# Patient Record
Sex: FEMALE | Race: BLACK OR AFRICAN AMERICAN
[De-identification: names, ages, dates, MRNs, and addresses within clinical notes are randomized per-mention and may not be internally consistent; named-entity substitution may affect disease eponyms.]

---

## 2019-12-15 ENCOUNTER — HOSPITAL ENCOUNTER (EMERGENCY)
Dept: HOSPITAL 82 - ED | Age: 59
Discharge: HOME | End: 2019-12-15
Payer: MEDICARE

## 2019-12-15 VITALS — HEIGHT: 55 IN | WEIGHT: 123.46 LBS | BODY MASS INDEX: 28.57 KG/M2

## 2019-12-15 VITALS — DIASTOLIC BLOOD PRESSURE: 87 MMHG | SYSTOLIC BLOOD PRESSURE: 150 MMHG

## 2019-12-15 DIAGNOSIS — E11.9: ICD-10-CM

## 2019-12-15 DIAGNOSIS — Z79.84: ICD-10-CM

## 2019-12-15 DIAGNOSIS — I10: ICD-10-CM

## 2019-12-15 DIAGNOSIS — J06.9: Primary | ICD-10-CM

## 2020-10-05 ENCOUNTER — HOSPITAL ENCOUNTER (EMERGENCY)
Dept: HOSPITAL 82 - ED | Age: 60
Discharge: HOME | End: 2020-10-05
Payer: MEDICARE

## 2020-10-05 VITALS — DIASTOLIC BLOOD PRESSURE: 77 MMHG | SYSTOLIC BLOOD PRESSURE: 122 MMHG

## 2020-10-05 VITALS — BODY MASS INDEX: 22.96 KG/M2 | WEIGHT: 99.21 LBS | HEIGHT: 55 IN

## 2020-10-05 DIAGNOSIS — S80.01XA: Primary | ICD-10-CM

## 2020-10-05 DIAGNOSIS — I10: ICD-10-CM

## 2020-10-05 DIAGNOSIS — E11.9: ICD-10-CM

## 2020-10-05 DIAGNOSIS — Z79.4: ICD-10-CM
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
## 2021-02-20 ENCOUNTER — HOSPITAL ENCOUNTER (EMERGENCY)
Dept: HOSPITAL 82 - ED | Age: 61
LOS: 1 days | Discharge: NURSING, REHAB OR LONG-TERM CARE | End: 2021-02-21
Payer: MEDICARE

## 2021-02-20 VITALS — HEIGHT: 58 IN | WEIGHT: 105.82 LBS | BODY MASS INDEX: 22.21 KG/M2
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
## 2021-02-20 DIAGNOSIS — S43.014A: Primary | ICD-10-CM
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
## 2021-02-20 DIAGNOSIS — I10: ICD-10-CM

## 2021-02-20 DIAGNOSIS — Z79.4: ICD-10-CM

## 2021-02-20 DIAGNOSIS — Z20.822: ICD-10-CM

## 2021-02-20 DIAGNOSIS — E11.9: ICD-10-CM

## 2021-02-20 DIAGNOSIS — R20.2: ICD-10-CM

## 2021-02-20 PROCEDURE — 0RSJXZZ REPOSITION RIGHT SHOULDER JOINT, EXTERNAL APPROACH: ICD-10-PCS | Performed by: EMERGENCY MEDICINE

## 2021-02-21 VITALS — SYSTOLIC BLOOD PRESSURE: 124 MMHG | DIASTOLIC BLOOD PRESSURE: 60 MMHG

## 2021-07-09 ENCOUNTER — HOSPITAL ENCOUNTER (OUTPATIENT)
Dept: HOSPITAL 82 - ED | Age: 61
Setting detail: OBSERVATION
LOS: 1 days | Discharge: HOME | End: 2021-07-10
Attending: STUDENT IN AN ORGANIZED HEALTH CARE EDUCATION/TRAINING PROGRAM | Admitting: STUDENT IN AN ORGANIZED HEALTH CARE EDUCATION/TRAINING PROGRAM
Payer: MEDICARE

## 2021-07-09 VITALS — HEIGHT: 58 IN | WEIGHT: 112.44 LBS | BODY MASS INDEX: 23.6 KG/M2

## 2021-07-09 DIAGNOSIS — Z79.4: ICD-10-CM

## 2021-07-09 DIAGNOSIS — F79: ICD-10-CM

## 2021-07-09 DIAGNOSIS — E11.649: Primary | ICD-10-CM

## 2021-07-09 DIAGNOSIS — Z20.822: ICD-10-CM

## 2021-07-09 DIAGNOSIS — I10: ICD-10-CM

## 2021-07-09 DIAGNOSIS — E78.5: ICD-10-CM

## 2021-07-09 LAB
ALBUMIN SERPL-MCNC: 4.2 G/DL (ref 3.2–5)
ALP SERPL-CCNC: 92 U/L (ref 38–126)
ANION GAP SERPL CALCULATED.3IONS-SCNC: 17 MMOL/L
AST SERPL-CCNC: 43 U/L (ref 9–36)
BASOPHILS NFR BLD AUTO: 0 % (ref 0–3)
BUN SERPL-MCNC: 16 MG/DL (ref 8–23)
BUN/CREAT SERPL: 32
CHLORIDE SERPL-SCNC: 101 MMOL/L (ref 95–108)
CO2 SERPL-SCNC: 23 MMOL/L (ref 22–30)
CREAT SERPL-MCNC: 0.5 MG/DL (ref 0.5–1)
EOSINOPHIL NFR BLD AUTO: 0 % (ref 0–8)
ERYTHROCYTE [DISTWIDTH] IN BLOOD BY AUTOMATED COUNT: 13.9 % (ref 11.5–15.5)
ETHANOL SERPL-MCNC: 0 MG/DL (ref 0–30)
HCT VFR BLD AUTO: 39.1 % (ref 37–47)
HGB BLD-MCNC: 12.3 G/DL (ref 12–16)
IMM GRANULOCYTES NFR BLD: 0.3 % (ref 0–5)
LIPASE SERPL-CCNC: 554 U/L (ref 23–300)
LYMPHOCYTES NFR BLD: 17 % (ref 15–41)
MCH RBC QN AUTO: 29.6 PG  CALC (ref 26–32)
MCHC RBC AUTO-ENTMCNC: 31.5 G/DL CAL (ref 32–36)
MCV RBC AUTO: 94 FL  CALC (ref 80–100)
MONOCYTES NFR BLD AUTO: 7 % (ref 2–13)
NEUTROPHILS # BLD AUTO: 7.39 THOU/UL (ref 2–7.15)
NEUTROPHILS NFR BLD AUTO: 76 % (ref 42–76)
PLATELET # BLD AUTO: 326 THOU/UL (ref 130–400)
POTASSIUM SERPL-SCNC: 4.2 MMOL/L (ref 3.5–5.1)
PROT SERPL-MCNC: 8.1 G/DL (ref 6.3–8.2)
RBC # BLD AUTO: 4.16 MILL/UL (ref 4.2–5.6)
SODIUM SERPL-SCNC: 137 MMOL/L (ref 137–146)

## 2021-07-09 PROCEDURE — G0378 HOSPITAL OBSERVATION PER HR: HCPCS

## 2021-07-09 NOTE — NUR
phoned patient sister candido deng at home phone and cell phone left a voice
message to return call to discuss medications that were given.

## 2021-07-09 NOTE — NUR
PT ATE NUTRIO GARAIN BAR, AND SNACK SIZE CANDY PIECE (PER MD OK) HER SISTER
BROUGHT HER A SERVING OF GRAPES AND SHE ATE THOSE AS WELL, ACCU CHECK CURRENTLY
262. PT STATES FEELING FINE.

## 2021-07-09 NOTE — NUR
pt resting second snack provided per MD request accu check at this time 157,
per sister (via phone) she is on her way to [facility]>

## 2021-07-09 NOTE — NUR
care assumed pt eating food tray, tolerating weel, offers no complaints, states
that her sister usually doles out her medication but she administers it to 
herself.

## 2021-07-09 NOTE — NUR
UPON A NURSE RECHECKING PT BG, IT HAS DROPPED FROM 121 TO 50. MD NOTIFIED AND
D50% GIVEN IV. ORANGE JUICE WAS GIVEN AS WELL. CT DELAYED UNTIL SUGARS ARE
CONTROLLED. PT AOX4.

## 2021-07-09 NOTE — NUR
PRINTED SBAR RECEIVED. CALL RECEIVED FROM ED REGARDING PT PLACEMENT. ROOM 272
ASSIGNED AT THIS TIME. ROOM PREPARED TO RECEIVE PT BY ANSON CLINTON AND ELA CLINTON.

## 2021-07-09 NOTE — NUR
SISTER AT BEDSIDE STATES THAT THEY CHANGED HER MEDICATION RECENTLY AND SH HAS
BEEN RUNNING LOW IN THE MORNINGS. INSTRUCTED TO MAKE SURE PT IS TAKING A GOOD
SNACK PRIOR TO BEDTIME TO HELP CARRY HER THRU THE NIGHT. AND TO KEEP A RUNNING
OLOG OD BS'S TO TAKE TO F/U VISIT

## 2021-07-09 NOTE — NUR
PT ARRIVES TO ROOM VIA EMS. BEDSIDE TRIAGE COMPLETED. EMS UNABLE TO TELL US
MEDICATIONS AND PATIENT HAS HX OF MENTAL DELAY. AWAITING FAMILY TO ARRIVE

## 2021-07-10 VITALS — SYSTOLIC BLOOD PRESSURE: 154 MMHG | DIASTOLIC BLOOD PRESSURE: 81 MMHG

## 2021-07-10 VITALS — DIASTOLIC BLOOD PRESSURE: 78 MMHG | SYSTOLIC BLOOD PRESSURE: 133 MMHG

## 2021-07-10 VITALS — DIASTOLIC BLOOD PRESSURE: 60 MMHG | SYSTOLIC BLOOD PRESSURE: 140 MMHG

## 2021-07-10 VITALS — SYSTOLIC BLOOD PRESSURE: 119 MMHG | DIASTOLIC BLOOD PRESSURE: 66 MMHG

## 2021-07-10 LAB
BILIRUB UR QL STRIP.AUTO: NEGATIVE
COLOR UR AUTO: YELLOW
GLUCOSE UR STRIP.AUTO-MCNC: >=1000 MG/DL
HGB UR QL STRIP.AUTO: NEGATIVE
KETONES UR STRIP.AUTO-MCNC: NEGATIVE MG/DL
LEUKOCYTE ESTERASE UR QL STRIP.AUTO: NEGATIVE
NITRITE UR QL STRIP.AUTO: NEGATIVE
PH UR STRIP.AUTO: 7 [PH] (ref 4.5–8)
PROT UR QL STRIP.AUTO: NEGATIVE MG/DL
SP GR UR STRIP.AUTO: 1.02
UROBILINOGEN UR QL STRIP.AUTO: 0.2 E.U./DL

## 2021-07-10 NOTE — NUR
PT TRANSPORTED TO MED SURG VIA STRETCHER WITH TELE IN PLACE AND ALL BELONGINGS
SENT WITH PT, RECORDS BELONGING FORM (200) COMPLETED.

## 2021-07-10 NOTE — NUR
CALL RECEIVED FROM PT'S SISTER/ CAREGIVER. COMMUNICATION CODE PROVIDED BY
SISTER. PLAN OF CARE AND PT'S CONDITION REVIEWED. QUESTIONS ANSWERED.

## 2021-07-10 NOTE — NUR
REPORT RECEIVED FROM JR DRAPER. PATIENT IS EATING HER BREAKFAST WITH NO
DISTRESS NOTED. PATIENT DENIES NEEDS. SAFETY PRECAUTION REINFORCED AND CALL
LIGHT IN REACH.

## 2021-07-10 NOTE — NUR
PATIENT IS EATING HER LUNCH WITH NO DISTRESS NOTED. PATIENT DENIES ANY NEEDS
AT THIS TIME. CALL LIGHT IN REACH.

## 2021-07-10 NOTE — NUR
POINT OF CARE GLUCOSE 46mg/dl. PT ASYMPTOMATIC. GIVEN ORANGE JUICE TO DRINK.
ORDER FOR D10% RECEIVED FROM DR. MARTIN. D10% IV ADMINISTERED, SEE E-MAR.

## 2021-07-10 NOTE — NUR
AT 0103 PT ARRIVES TO UNIT VIA STRETCHER, ACCOMPANIED BY SAMUEL TYLER RN. PT
ADMITTED TO ROOM 272. AMBULATORY FROM STRETCHER TO BED. GAIT STEADY AND
BALANCED. PT ORIENTED TO ROOM, BED AND CALL BELL SYSTEM. CALL BELL WITHIN
REACH, AGREES TO CALL PRN.

## 2021-07-10 NOTE — NUR
POINT OF CARE GLUCOSE 161mg/dl. PT REQUESTS ORANGE JUICE. ORANGE JUICE
PROVIDED. PT DENIES FURTHER NEEDS AT THIS TIME. CALL BELL WITHIN REACH, AGREES
TO CALL PRN.

## 2021-09-02 ENCOUNTER — HOSPITAL ENCOUNTER (EMERGENCY)
Dept: HOSPITAL 82 - ED | Age: 61
Discharge: HOME | End: 2021-09-02
Payer: MEDICARE

## 2021-09-02 VITALS — WEIGHT: 124.12 LBS | HEIGHT: 58 IN | BODY MASS INDEX: 26.05 KG/M2

## 2021-09-02 VITALS — DIASTOLIC BLOOD PRESSURE: 61 MMHG | SYSTOLIC BLOOD PRESSURE: 125 MMHG

## 2021-09-02 DIAGNOSIS — F79: ICD-10-CM

## 2021-09-02 DIAGNOSIS — E11.649: Primary | ICD-10-CM

## 2021-09-02 DIAGNOSIS — Z79.4: ICD-10-CM

## 2021-09-02 LAB
ALBUMIN SERPL-MCNC: 4.4 G/DL (ref 3.2–5)
ALP SERPL-CCNC: 92 U/L (ref 38–126)
AMYLASE SERPL-CCNC: 202 U/L (ref 30–110)
ANION GAP SERPL CALCULATED.3IONS-SCNC: 18 MMOL/L
AST SERPL-CCNC: 38 U/L (ref 9–36)
BASOPHILS NFR BLD AUTO: 0 % (ref 0–3)
BUN SERPL-MCNC: 18 MG/DL (ref 8–23)
BUN/CREAT SERPL: 24
CHLORIDE SERPL-SCNC: 105 MMOL/L (ref 95–108)
CO2 SERPL-SCNC: 19 MMOL/L (ref 22–30)
CREAT SERPL-MCNC: 0.8 MG/DL (ref 0.5–1)
EOSINOPHIL NFR BLD AUTO: 2 % (ref 0–8)
ERYTHROCYTE [DISTWIDTH] IN BLOOD BY AUTOMATED COUNT: 14.8 % (ref 11.5–15.5)
HCT VFR BLD AUTO: 39.3 % (ref 37–47)
HGB BLD-MCNC: 12 G/DL (ref 12–16)
IMM GRANULOCYTES NFR BLD: 0.2 % (ref 0–5)
LIPASE SERPL-CCNC: 417 U/L (ref 23–300)
LYMPHOCYTES NFR BLD: 21 % (ref 15–41)
MCH RBC QN AUTO: 29.9 PG  CALC (ref 26–32)
MCHC RBC AUTO-ENTMCNC: 30.5 G/DL CAL (ref 32–36)
MCV RBC AUTO: 98 FL  CALC (ref 80–100)
MONOCYTES NFR BLD AUTO: 8 % (ref 2–13)
NEUTROPHILS # BLD AUTO: 6.44 THOU/UL (ref 2–7.15)
NEUTROPHILS NFR BLD AUTO: 69 % (ref 42–76)
PLATELET # BLD AUTO: 248 THOU/UL (ref 130–400)
POTASSIUM SERPL-SCNC: 3.9 MMOL/L (ref 3.5–5.1)
PROT SERPL-MCNC: 8 G/DL (ref 6.3–8.2)
RBC # BLD AUTO: 4.01 MILL/UL (ref 4.2–5.6)
SODIUM SERPL-SCNC: 139 MMOL/L (ref 137–146)

## 2021-09-04 ENCOUNTER — HOSPITAL ENCOUNTER (EMERGENCY)
Dept: HOSPITAL 82 - ED | Age: 61
Discharge: HOME | End: 2021-09-04
Payer: MEDICARE

## 2021-09-04 VITALS — WEIGHT: 123.46 LBS | HEIGHT: 58 IN | BODY MASS INDEX: 25.92 KG/M2

## 2021-09-04 VITALS — DIASTOLIC BLOOD PRESSURE: 65 MMHG | SYSTOLIC BLOOD PRESSURE: 134 MMHG

## 2021-09-04 DIAGNOSIS — Z79.4: ICD-10-CM

## 2021-09-04 DIAGNOSIS — Z20.822: ICD-10-CM

## 2021-09-04 DIAGNOSIS — E11.649: Primary | ICD-10-CM

## 2021-09-04 LAB
ALBUMIN SERPL-MCNC: 2.9 G/DL (ref 3.2–5)
ALP SERPL-CCNC: 68 U/L (ref 38–126)
ANION GAP SERPL CALCULATED.3IONS-SCNC: 11 MMOL/L
AST SERPL-CCNC: 32 U/L (ref 9–36)
BASOPHILS NFR BLD AUTO: 0 % (ref 0–3)
BUN SERPL-MCNC: 19 MG/DL (ref 8–23)
BUN/CREAT SERPL: 38
CHLORIDE SERPL-SCNC: 111 MMOL/L (ref 95–108)
CO2 SERPL-SCNC: 19 MMOL/L (ref 22–30)
CREAT SERPL-MCNC: 0.5 MG/DL (ref 0.5–1)
EOSINOPHIL NFR BLD AUTO: 0 % (ref 0–8)
ERYTHROCYTE [DISTWIDTH] IN BLOOD BY AUTOMATED COUNT: 14.8 % (ref 11.5–15.5)
HCT VFR BLD AUTO: 34.5 % (ref 37–47)
HGB BLD-MCNC: 10.5 G/DL (ref 12–16)
IMM GRANULOCYTES NFR BLD: 0.2 % (ref 0–5)
LYMPHOCYTES NFR BLD: 10 % (ref 15–41)
MCH RBC QN AUTO: 29.7 PG  CALC (ref 26–32)
MCHC RBC AUTO-ENTMCNC: 30.4 G/DL CAL (ref 32–36)
MCV RBC AUTO: 97.7 FL  CALC (ref 80–100)
MONOCYTES NFR BLD AUTO: 4 % (ref 2–13)
NEUTROPHILS # BLD AUTO: 7.72 THOU/UL (ref 2–7.15)
NEUTROPHILS NFR BLD AUTO: 86 % (ref 42–76)
PLATELET # BLD AUTO: 295 THOU/UL (ref 130–400)
POTASSIUM SERPL-SCNC: 3.8 MMOL/L (ref 3.5–5.1)
PROT SERPL-MCNC: 5.7 G/DL (ref 6.3–8.2)
RBC # BLD AUTO: 3.53 MILL/UL (ref 4.2–5.6)
SODIUM SERPL-SCNC: 137 MMOL/L (ref 137–146)

## 2023-01-12 ENCOUNTER — HOSPITAL ENCOUNTER (EMERGENCY)
Dept: HOSPITAL 82 - ED | Age: 63
Discharge: HOME | End: 2023-01-12
Payer: MEDICARE

## 2023-01-12 VITALS — SYSTOLIC BLOOD PRESSURE: 164 MMHG | DIASTOLIC BLOOD PRESSURE: 83 MMHG

## 2023-01-12 VITALS — DIASTOLIC BLOOD PRESSURE: 80 MMHG | SYSTOLIC BLOOD PRESSURE: 156 MMHG

## 2023-01-12 VITALS — SYSTOLIC BLOOD PRESSURE: 157 MMHG | DIASTOLIC BLOOD PRESSURE: 73 MMHG

## 2023-01-12 VITALS — SYSTOLIC BLOOD PRESSURE: 186 MMHG | DIASTOLIC BLOOD PRESSURE: 89 MMHG

## 2023-01-12 VITALS — SYSTOLIC BLOOD PRESSURE: 155 MMHG | DIASTOLIC BLOOD PRESSURE: 79 MMHG

## 2023-01-12 VITALS — WEIGHT: 127.87 LBS | HEIGHT: 58 IN | BODY MASS INDEX: 26.84 KG/M2

## 2023-01-12 VITALS — SYSTOLIC BLOOD PRESSURE: 182 MMHG | DIASTOLIC BLOOD PRESSURE: 80 MMHG

## 2023-01-12 VITALS — SYSTOLIC BLOOD PRESSURE: 139 MMHG | DIASTOLIC BLOOD PRESSURE: 115 MMHG

## 2023-01-12 VITALS — SYSTOLIC BLOOD PRESSURE: 150 MMHG | DIASTOLIC BLOOD PRESSURE: 83 MMHG

## 2023-01-12 VITALS — DIASTOLIC BLOOD PRESSURE: 87 MMHG | SYSTOLIC BLOOD PRESSURE: 172 MMHG

## 2023-01-12 VITALS — DIASTOLIC BLOOD PRESSURE: 78 MMHG | SYSTOLIC BLOOD PRESSURE: 169 MMHG

## 2023-01-12 VITALS — SYSTOLIC BLOOD PRESSURE: 165 MMHG | DIASTOLIC BLOOD PRESSURE: 91 MMHG

## 2023-01-12 VITALS — DIASTOLIC BLOOD PRESSURE: 80 MMHG | SYSTOLIC BLOOD PRESSURE: 152 MMHG

## 2023-01-12 VITALS — DIASTOLIC BLOOD PRESSURE: 92 MMHG | SYSTOLIC BLOOD PRESSURE: 187 MMHG

## 2023-01-12 VITALS — DIASTOLIC BLOOD PRESSURE: 80 MMHG | SYSTOLIC BLOOD PRESSURE: 150 MMHG

## 2023-01-12 VITALS — SYSTOLIC BLOOD PRESSURE: 120 MMHG | DIASTOLIC BLOOD PRESSURE: 104 MMHG

## 2023-01-12 VITALS — DIASTOLIC BLOOD PRESSURE: 80 MMHG | SYSTOLIC BLOOD PRESSURE: 186 MMHG

## 2023-01-12 VITALS — DIASTOLIC BLOOD PRESSURE: 71 MMHG | SYSTOLIC BLOOD PRESSURE: 151 MMHG

## 2023-01-12 VITALS — DIASTOLIC BLOOD PRESSURE: 87 MMHG | SYSTOLIC BLOOD PRESSURE: 141 MMHG

## 2023-01-12 VITALS — DIASTOLIC BLOOD PRESSURE: 85 MMHG | SYSTOLIC BLOOD PRESSURE: 147 MMHG

## 2023-01-12 DIAGNOSIS — E11.649: Primary | ICD-10-CM

## 2023-01-12 DIAGNOSIS — Z79.84: ICD-10-CM

## 2023-01-12 DIAGNOSIS — R41.0: ICD-10-CM

## 2023-01-12 DIAGNOSIS — Z79.4: ICD-10-CM

## 2023-01-12 DIAGNOSIS — M43.6: Primary | ICD-10-CM

## 2023-01-12 DIAGNOSIS — I10: ICD-10-CM

## 2023-01-12 DIAGNOSIS — J06.9: ICD-10-CM

## 2023-01-12 DIAGNOSIS — E11.649: ICD-10-CM

## 2023-01-12 DIAGNOSIS — E11.9: ICD-10-CM

## 2023-01-12 DIAGNOSIS — Z20.822: ICD-10-CM

## 2023-01-12 LAB
ALBUMIN SERPL-MCNC: 4.6 G/DL (ref 3.2–5)
ALP SERPL-CCNC: 175 U/L (ref 38–126)
ANION GAP SERPL CALCULATED.3IONS-SCNC: 10 MMOL/L
AST SERPL-CCNC: 46 U/L (ref 9–36)
BASOPHILS NFR BLD AUTO: 0.4 % (ref 0–3)
BUN SERPL-MCNC: 16 MG/DL (ref 8–23)
BUN/CREAT SERPL: 17
CHLORIDE SERPL-SCNC: 105 MMOL/L (ref 95–108)
CO2 SERPL-SCNC: 30 MMOL/L (ref 22–30)
CREAT SERPL-MCNC: 0.9 MG/DL (ref 0.5–1)
EOSINOPHIL NFR BLD AUTO: 0.5 % (ref 0–8)
ERYTHROCYTE [DISTWIDTH] IN BLOOD BY AUTOMATED COUNT: 13.3 % (ref 11.5–15.5)
HCT VFR BLD AUTO: 43 % (ref 37–47)
HGB BLD-MCNC: 14 G/DL (ref 12–16)
IMM GRANULOCYTES NFR BLD: 0.1 % (ref 0–5)
LYMPHOCYTES NFR BLD: 10.2 % (ref 15–41)
MCH RBC QN AUTO: 30.8 PG  CALC (ref 26–32)
MCHC RBC AUTO-ENTMCNC: 32.6 G/DL CAL (ref 32–36)
MCV RBC AUTO: 94.7 FL  CALC (ref 80–100)
MONOCYTES NFR BLD AUTO: 4.9 % (ref 2–13)
NEUTROPHILS # BLD AUTO: 9.66 THOU/UL (ref 2–7.15)
NEUTROPHILS NFR BLD AUTO: 83.9 % (ref 42–76)
PLATELET # BLD AUTO: 229 THOU/UL (ref 130–400)
POTASSIUM SERPL-SCNC: 3.8 MMOL/L (ref 3.5–5.1)
PROT SERPL-MCNC: 8.5 G/DL (ref 6.3–8.2)
RBC # BLD AUTO: 4.54 MILL/UL (ref 4.2–5.6)
SODIUM SERPL-SCNC: 141 MMOL/L (ref 137–146)

## 2023-08-22 ENCOUNTER — HOSPITAL ENCOUNTER (EMERGENCY)
Dept: HOSPITAL 82 - ED | Age: 63
Discharge: HOME | End: 2023-08-22
Payer: MEDICARE

## 2023-08-22 VITALS — HEIGHT: 58 IN | BODY MASS INDEX: 26.84 KG/M2 | WEIGHT: 127.87 LBS

## 2023-08-22 VITALS — SYSTOLIC BLOOD PRESSURE: 142 MMHG | DIASTOLIC BLOOD PRESSURE: 74 MMHG

## 2023-08-22 DIAGNOSIS — E11.9: ICD-10-CM

## 2023-08-22 DIAGNOSIS — Z79.4: ICD-10-CM
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
## 2023-08-22 DIAGNOSIS — I10: ICD-10-CM

## 2023-08-22 DIAGNOSIS — S33.5XXA: Primary | ICD-10-CM
